# Patient Record
Sex: FEMALE | NOT HISPANIC OR LATINO | Employment: OTHER | URBAN - METROPOLITAN AREA
[De-identification: names, ages, dates, MRNs, and addresses within clinical notes are randomized per-mention and may not be internally consistent; named-entity substitution may affect disease eponyms.]

---

## 2023-04-29 LAB — URINE CULTURE: NO GROWTH

## 2024-02-08 RX ORDER — POLYETHYLENE GLYOCOL 3350, SODIUM CHLORIDE, SODIUM BICARBONATE AND POTASSIUM CHLORIDE 420; 11.2; 5.72; 1.48 G/4L; G/4L; G/4L; G/4L
POWDER, FOR SOLUTION NASOGASTRIC; ORAL
COMMUNITY
Start: 2018-10-19

## 2024-02-08 RX ORDER — OMEGA-3-ACID ETHYL ESTERS 1 G/1
1 CAPSULE, LIQUID FILLED ORAL 2 TIMES DAILY
COMMUNITY
Start: 2016-08-08 | End: 2024-02-12 | Stop reason: WASHOUT

## 2024-02-08 RX ORDER — CHOLECALCIFEROL (VITAMIN D3) 50 MCG
1 TABLET ORAL DAILY
COMMUNITY
Start: 2016-06-06

## 2024-02-08 RX ORDER — ATENOLOL 50 MG/1
TABLET ORAL
COMMUNITY
Start: 2016-02-15 | End: 2024-02-12 | Stop reason: SDUPTHER

## 2024-02-08 RX ORDER — ACETAMINOPHEN 500 MG
TABLET ORAL
COMMUNITY

## 2024-02-08 RX ORDER — ATORVASTATIN CALCIUM 20 MG/1
TABLET, FILM COATED ORAL
COMMUNITY
Start: 2016-12-05 | End: 2024-02-12 | Stop reason: SDUPTHER

## 2024-02-08 RX ORDER — PNV NO.95/FERROUS FUM/FOLIC AC 28MG-0.8MG
1 TABLET ORAL DAILY
COMMUNITY
Start: 2016-06-06

## 2024-02-12 ENCOUNTER — OFFICE VISIT (OUTPATIENT)
Dept: PRIMARY CARE | Facility: CLINIC | Age: 74
End: 2024-02-12
Payer: MEDICARE

## 2024-02-12 ENCOUNTER — TELEPHONE (OUTPATIENT)
Dept: PRIMARY CARE | Facility: CLINIC | Age: 74
End: 2024-02-12

## 2024-02-12 VITALS
WEIGHT: 165 LBS | SYSTOLIC BLOOD PRESSURE: 130 MMHG | OXYGEN SATURATION: 95 % | HEIGHT: 64 IN | TEMPERATURE: 98.4 F | BODY MASS INDEX: 28.17 KG/M2 | DIASTOLIC BLOOD PRESSURE: 60 MMHG | HEART RATE: 59 BPM

## 2024-02-12 DIAGNOSIS — F10.10 ALCOHOL ABUSE: ICD-10-CM

## 2024-02-12 DIAGNOSIS — R73.01 IFG (IMPAIRED FASTING GLUCOSE): ICD-10-CM

## 2024-02-12 DIAGNOSIS — F17.210 CIGARETTE NICOTINE DEPENDENCE WITHOUT COMPLICATION: ICD-10-CM

## 2024-02-12 DIAGNOSIS — Z00.00 ROUTINE GENERAL MEDICAL EXAMINATION AT HEALTH CARE FACILITY: Primary | ICD-10-CM

## 2024-02-12 DIAGNOSIS — I10 BENIGN ESSENTIAL HYPERTENSION: ICD-10-CM

## 2024-02-12 DIAGNOSIS — F32.A DEPRESSIVE DISORDER: ICD-10-CM

## 2024-02-12 DIAGNOSIS — E78.2 MIXED HYPERLIPIDEMIA: ICD-10-CM

## 2024-02-12 PROBLEM — Z86.010 HX OF COLONIC POLYP: Status: ACTIVE | Noted: 2024-02-12

## 2024-02-12 PROBLEM — M89.9 DISORDER OF BONE AND ARTICULAR CARTILAGE: Status: ACTIVE | Noted: 2024-02-12

## 2024-02-12 PROBLEM — E66.9 OBESITY: Status: ACTIVE | Noted: 2024-02-12

## 2024-02-12 PROBLEM — R19.5 POSITIVE COLORECTAL CANCER SCREENING USING COLOGUARD TEST: Status: ACTIVE | Noted: 2024-02-12

## 2024-02-12 PROBLEM — K63.5 BENIGN COLON POLYP: Status: ACTIVE | Noted: 2024-02-12

## 2024-02-12 PROBLEM — Z86.0100 HX OF COLONIC POLYP: Status: ACTIVE | Noted: 2024-02-12

## 2024-02-12 PROBLEM — M94.9 DISORDER OF BONE AND ARTICULAR CARTILAGE: Status: ACTIVE | Noted: 2024-02-12

## 2024-02-12 PROBLEM — K57.90 DIVERTICULOSIS: Status: ACTIVE | Noted: 2024-02-12

## 2024-02-12 PROBLEM — Z85.3 HISTORY OF RIGHT BREAST CANCER: Status: ACTIVE | Noted: 2019-10-17

## 2024-02-12 PROBLEM — F17.200 NICOTINE DEPENDENCE: Status: ACTIVE | Noted: 2024-02-12

## 2024-02-12 PROBLEM — E53.8 VITAMIN B 12 DEFICIENCY: Status: ACTIVE | Noted: 2024-02-12

## 2024-02-12 PROBLEM — E55.9 VITAMIN D DEFICIENCY: Status: ACTIVE | Noted: 2024-02-12

## 2024-02-12 PROBLEM — K64.8 INTERNAL HEMORRHOIDS: Status: ACTIVE | Noted: 2024-02-12

## 2024-02-12 PROCEDURE — 3075F SYST BP GE 130 - 139MM HG: CPT | Performed by: FAMILY MEDICINE

## 2024-02-12 PROCEDURE — 1170F FXNL STATUS ASSESSED: CPT | Performed by: FAMILY MEDICINE

## 2024-02-12 PROCEDURE — 93000 ELECTROCARDIOGRAM COMPLETE: CPT | Performed by: FAMILY MEDICINE

## 2024-02-12 PROCEDURE — 3078F DIAST BP <80 MM HG: CPT | Performed by: FAMILY MEDICINE

## 2024-02-12 PROCEDURE — 99214 OFFICE O/P EST MOD 30 MIN: CPT | Performed by: FAMILY MEDICINE

## 2024-02-12 PROCEDURE — 1160F RVW MEDS BY RX/DR IN RCRD: CPT | Performed by: FAMILY MEDICINE

## 2024-02-12 PROCEDURE — 1159F MED LIST DOCD IN RCRD: CPT | Performed by: FAMILY MEDICINE

## 2024-02-12 PROCEDURE — 1126F AMNT PAIN NOTED NONE PRSNT: CPT | Performed by: FAMILY MEDICINE

## 2024-02-12 PROCEDURE — G0008 ADMIN INFLUENZA VIRUS VAC: HCPCS | Performed by: FAMILY MEDICINE

## 2024-02-12 PROCEDURE — 90662 IIV NO PRSV INCREASED AG IM: CPT | Performed by: FAMILY MEDICINE

## 2024-02-12 PROCEDURE — G0439 PPPS, SUBSEQ VISIT: HCPCS | Performed by: FAMILY MEDICINE

## 2024-02-12 RX ORDER — ATORVASTATIN CALCIUM 20 MG/1
20 TABLET, FILM COATED ORAL NIGHTLY
Qty: 90 TABLET | Refills: 3 | Status: SHIPPED | OUTPATIENT
Start: 2024-02-12 | End: 2025-02-11

## 2024-02-12 RX ORDER — ATENOLOL 50 MG/1
50 TABLET ORAL DAILY
Qty: 90 TABLET | Refills: 1 | Status: SHIPPED | OUTPATIENT
Start: 2024-02-12 | End: 2024-08-10

## 2024-02-12 ASSESSMENT — PATIENT HEALTH QUESTIONNAIRE - PHQ9
1. LITTLE INTEREST OR PLEASURE IN DOING THINGS: NOT AT ALL
SUM OF ALL RESPONSES TO PHQ9 QUESTIONS 1 AND 2: 0
2. FEELING DOWN, DEPRESSED OR HOPELESS: NOT AT ALL

## 2024-02-12 ASSESSMENT — ENCOUNTER SYMPTOMS
CARDIOVASCULAR NEGATIVE: 1
EYES NEGATIVE: 1
NEUROLOGICAL NEGATIVE: 1
CONSTITUTIONAL NEGATIVE: 1
RESPIRATORY NEGATIVE: 1
GASTROINTESTINAL NEGATIVE: 1
ALLERGIC/IMMUNOLOGIC NEGATIVE: 1
HEMATOLOGIC/LYMPHATIC NEGATIVE: 1
PSYCHIATRIC NEGATIVE: 1
MUSCULOSKELETAL NEGATIVE: 1

## 2024-02-12 ASSESSMENT — ACTIVITIES OF DAILY LIVING (ADL)
TAKING_MEDICATION: INDEPENDENT
DOING_HOUSEWORK: INDEPENDENT
GROCERY_SHOPPING: INDEPENDENT
DRESSING: INDEPENDENT
MANAGING_FINANCES: INDEPENDENT
BATHING: INDEPENDENT

## 2024-02-12 ASSESSMENT — PAIN SCALES - GENERAL: PAINLEVEL: 0-NO PAIN

## 2024-02-12 NOTE — PROGRESS NOTES
Subjective   Reason for Visit: Enedina Camacho is an 73 y.o. female here for a Medicare Wellness visit.     Past Medical, Surgical, and Family History reviewed and updated in chart.    Reviewed all medications by prescribing practitioner or clinical pharmacist (such as prescriptions, OTCs, herbal therapies and supplements) and documented in the medical record.    Pneumonia and COVID-19 vaccinations are UTD.  She agrees to receive an influenza vaccination today.  Tetanus vaccination status is unknown.  She declines a booster.  She also declines a shingles vaccination.  She had a normal screening mammogram completed last year, and she declines this year.  DEXA is overdue, and she declines an order this year.  She had a screening colonoscopy completed in 2018, and it was normal.  She was told to repeat it in 10 years.  She is due for an EKG and agrees to complete one today.  She did not complete fasting labs for this visit.  She currently smokes 1 ppd and has been smoking for 20-30 years.  She declines a CT of the lung.  1) HTN: controlled, compliant with atenolol, follows a low salt diet, walks 1 hour daily, needs a refill.   2) Hyperlipidemia: current status unknown, compliant with atorvastatin, follows a low fat diet, exercises regularly, needs refill.  3) IFG: current status unknown, no medication taken, tries to follow a low carb diet, exercises regularly.   4) Tobacco user: see above, does not want to quit.   5) Alcohol user: drinks a bottle of wine per week, does not drink it all at once, denies intoxication, drinking does not affect ADLs and house work.     6) Depression: resolved, denies drinking due to depression, denies SI and HI.    Patient Care Team:  Susana Roy DO as PCP - General     Review of Systems   Constitutional: Negative.    HENT: Negative.     Eyes: Negative.    Respiratory: Negative.     Cardiovascular: Negative.    Gastrointestinal: Negative.    Genitourinary: Negative.    Musculoskeletal:  "Negative.    Skin: Negative.    Allergic/Immunologic: Negative.    Neurological: Negative.    Hematological: Negative.    Psychiatric/Behavioral: Negative.       Objective   Vitals:  /60 (BP Location: Left arm)   Pulse 59   Temp 36.9 °C (98.4 °F) (Temporal)   Ht 1.613 m (5' 3.5\")   Wt 74.8 kg (165 lb)   SpO2 95%   BMI 28.77 kg/m²       Physical Exam  Vitals reviewed.   Constitutional:       Appearance: Normal appearance.   HENT:      Head: Normocephalic and atraumatic.      Right Ear: Tympanic membrane, ear canal and external ear normal.      Left Ear: Tympanic membrane, ear canal and external ear normal.      Nose: Nose normal.      Mouth/Throat:      Mouth: Mucous membranes are moist.      Pharynx: Oropharynx is clear.   Eyes:      Extraocular Movements: Extraocular movements intact.      Conjunctiva/sclera: Conjunctivae normal.      Pupils: Pupils are equal, round, and reactive to light.   Cardiovascular:      Rate and Rhythm: Normal rate and regular rhythm.      Pulses: Normal pulses.      Heart sounds: Normal heart sounds.   Pulmonary:      Effort: Pulmonary effort is normal.      Breath sounds: Normal breath sounds.   Abdominal:      General: Bowel sounds are normal.      Palpations: Abdomen is soft.   Musculoskeletal:         General: Normal range of motion.      Cervical back: Normal range of motion and neck supple.   Skin:     General: Skin is warm and dry.      Capillary Refill: Capillary refill takes less than 2 seconds.   Neurological:      General: No focal deficit present.      Mental Status: She is alert and oriented to person, place, and time.   Psychiatric:         Mood and Affect: Mood normal.         Behavior: Behavior normal.     Assessment/Plan   Problem List Items Addressed This Visit       Benign essential hypertension    Relevant Medications    atenolol (Tenormin) 50 mg tablet    Other Relevant Orders    ECG 12 lead (Clinic Performed) (Completed)    CBC and Auto Differential    " Comprehensive Metabolic Panel    Urinalysis with Reflex Microscopic    Albumin , Urine Random    Mixed hyperlipidemia    Relevant Medications    atorvastatin (Lipitor) 20 mg tablet    Other Relevant Orders    Comprehensive Metabolic Panel    Lipid Panel    Urinalysis with Reflex Microscopic    IFG (impaired fasting glucose)    Relevant Orders    Comprehensive Metabolic Panel    Hemoglobin A1C    Urinalysis with Reflex Microscopic    Albumin , Urine Random    Nicotine dependence    Depressive disorder    Alcohol abuse     Other Visit Diagnoses       Routine general medical examination at health care facility    -  Primary    Relevant Orders    CBC and Auto Differential    Comprehensive Metabolic Panel    Hemoglobin A1C    Lipid Panel    Urinalysis with Reflex Microscopic    Albumin , Urine Random        Routine GME  Pneumonia and COVID-19 vaccinations UTD.  Influenza vaccination given today.   Patient declines tetanus and shingles vaccinations.    Patient declines breast cancer screening.  Patient declines DEXA.  Colon cancer screening UTD.  Patient declines lung cancer screening.  EKG completed.  Fasting labs ordered.  Will notify with results.   Repeat exam in 1 year.  HTN  Controlled.  Continue with atenolol, refilled.    Low salt diet.  Regular exercise.  Manage weight.  Follow up in 6 months.    Hyperlipidemia  Current status unknown.  Labs ordered.  Will notify with results.  Continue with atorvastatin, refilled.    Low fat diet.  Regular exercise.  Manage weight.  Follow up in 1 year.   IFG  Current status unknown.  Labs ordered.  Will notify with results.  No medication prescribed.  Low carb diet.  Regular exercise.  Manage weight.  Follow up in 1 year.   Tobacco user  Current.  Patient declines cessation.  Follow up in 1 year.    Alcohol user  Current.  Alcohol use does not affect ADLs.  One glass a day is appropriate.  Follow up in 1 year.  Depression  Resolved.  No medication taken.  Monitor mood.  Follow  up in 1 year.

## 2024-02-13 ENCOUNTER — TELEPHONE (OUTPATIENT)
Dept: PRIMARY CARE | Facility: CLINIC | Age: 74
End: 2024-02-13
Payer: MEDICARE

## 2024-02-13 NOTE — TELEPHONE ENCOUNTER
Pts sister called Gordy  [phone] was seen yesterday for a CPE, she did not go in the room with her but thinks she should from now on as pt does not always understand things,  is concerned about her she will take her to get her blood work done as pt said  she didn't have any forms

## 2024-02-16 ENCOUNTER — LAB (OUTPATIENT)
Dept: LAB | Facility: LAB | Age: 74
End: 2024-02-16
Payer: MEDICARE

## 2024-02-16 DIAGNOSIS — R73.01 IFG (IMPAIRED FASTING GLUCOSE): ICD-10-CM

## 2024-02-16 DIAGNOSIS — I10 BENIGN ESSENTIAL HYPERTENSION: ICD-10-CM

## 2024-02-16 DIAGNOSIS — Z00.00 ROUTINE GENERAL MEDICAL EXAMINATION AT HEALTH CARE FACILITY: ICD-10-CM

## 2024-02-16 DIAGNOSIS — E78.2 MIXED HYPERLIPIDEMIA: ICD-10-CM

## 2024-02-16 LAB
ALBUMIN SERPL-MCNC: 4 G/DL (ref 3.5–5)
ALP BLD-CCNC: 81 U/L (ref 35–125)
ALT SERPL-CCNC: 13 U/L (ref 5–40)
ANION GAP SERPL CALC-SCNC: 12 MMOL/L
APPEARANCE UR: CLEAR
AST SERPL-CCNC: 17 U/L (ref 5–40)
BASOPHILS # BLD AUTO: 0.04 X10*3/UL (ref 0–0.1)
BASOPHILS NFR BLD AUTO: 0.6 %
BILIRUB SERPL-MCNC: 0.2 MG/DL (ref 0.1–1.2)
BILIRUB UR STRIP.AUTO-MCNC: NEGATIVE MG/DL
BUN SERPL-MCNC: 11 MG/DL (ref 8–25)
CALCIUM SERPL-MCNC: 9.4 MG/DL (ref 8.5–10.4)
CHLORIDE SERPL-SCNC: 104 MMOL/L (ref 97–107)
CHOLEST SERPL-MCNC: 171 MG/DL (ref 133–200)
CHOLEST/HDLC SERPL: 3.2 {RATIO}
CO2 SERPL-SCNC: 27 MMOL/L (ref 24–31)
COLOR UR: COLORLESS
CREAT SERPL-MCNC: 0.8 MG/DL (ref 0.4–1.6)
CREAT UR-MCNC: 51.9 MG/DL
EGFRCR SERPLBLD CKD-EPI 2021: 77 ML/MIN/1.73M*2
EOSINOPHIL # BLD AUTO: 0.18 X10*3/UL (ref 0–0.4)
EOSINOPHIL NFR BLD AUTO: 2.8 %
ERYTHROCYTE [DISTWIDTH] IN BLOOD BY AUTOMATED COUNT: 12.7 % (ref 11.5–14.5)
EST. AVERAGE GLUCOSE BLD GHB EST-MCNC: 108 MG/DL
GLUCOSE SERPL-MCNC: 97 MG/DL (ref 65–99)
GLUCOSE UR STRIP.AUTO-MCNC: NORMAL MG/DL
HBA1C MFR BLD: 5.4 %
HCT VFR BLD AUTO: 41.2 % (ref 36–46)
HDLC SERPL-MCNC: 53 MG/DL
HGB BLD-MCNC: 13.1 G/DL (ref 12–16)
IMM GRANULOCYTES # BLD AUTO: 0.02 X10*3/UL (ref 0–0.5)
IMM GRANULOCYTES NFR BLD AUTO: 0.3 % (ref 0–0.9)
KETONES UR STRIP.AUTO-MCNC: NEGATIVE MG/DL
LDLC SERPL CALC-MCNC: 94 MG/DL (ref 65–130)
LEUKOCYTE ESTERASE UR QL STRIP.AUTO: ABNORMAL
LYMPHOCYTES # BLD AUTO: 1.07 X10*3/UL (ref 0.8–3)
LYMPHOCYTES NFR BLD AUTO: 16.6 %
MCH RBC QN AUTO: 30.5 PG (ref 26–34)
MCHC RBC AUTO-ENTMCNC: 31.8 G/DL (ref 32–36)
MCV RBC AUTO: 96 FL (ref 80–100)
MICROALBUMIN UR-MCNC: <12 MG/L (ref 0–23)
MICROALBUMIN/CREAT UR: NORMAL MG/G{CREAT}
MONOCYTES # BLD AUTO: 0.38 X10*3/UL (ref 0.05–0.8)
MONOCYTES NFR BLD AUTO: 5.9 %
MUCOUS THREADS #/AREA URNS AUTO: NORMAL /LPF
NEUTROPHILS # BLD AUTO: 4.76 X10*3/UL (ref 1.6–5.5)
NEUTROPHILS NFR BLD AUTO: 73.8 %
NITRITE UR QL STRIP.AUTO: NEGATIVE
NRBC BLD-RTO: 0 /100 WBCS (ref 0–0)
PH UR STRIP.AUTO: 5 [PH]
PLATELET # BLD AUTO: 265 X10*3/UL (ref 150–450)
POTASSIUM SERPL-SCNC: 4.8 MMOL/L (ref 3.4–5.1)
PROT SERPL-MCNC: 6 G/DL (ref 5.9–7.9)
PROT UR STRIP.AUTO-MCNC: NEGATIVE MG/DL
RBC # BLD AUTO: 4.3 X10*6/UL (ref 4–5.2)
RBC # UR STRIP.AUTO: NEGATIVE /UL
RBC #/AREA URNS AUTO: NORMAL /HPF
SODIUM SERPL-SCNC: 143 MMOL/L (ref 133–145)
SP GR UR STRIP.AUTO: 1.01
SQUAMOUS #/AREA URNS AUTO: NORMAL /HPF
TRIGL SERPL-MCNC: 120 MG/DL (ref 40–150)
UROBILINOGEN UR STRIP.AUTO-MCNC: NORMAL MG/DL
WBC # BLD AUTO: 6.5 X10*3/UL (ref 4.4–11.3)
WBC #/AREA URNS AUTO: NORMAL /HPF

## 2024-02-16 PROCEDURE — 80061 LIPID PANEL: CPT

## 2024-02-16 PROCEDURE — 83036 HEMOGLOBIN GLYCOSYLATED A1C: CPT

## 2024-02-16 PROCEDURE — 82570 ASSAY OF URINE CREATININE: CPT

## 2024-02-16 PROCEDURE — 82043 UR ALBUMIN QUANTITATIVE: CPT

## 2024-02-16 PROCEDURE — 36415 COLL VENOUS BLD VENIPUNCTURE: CPT

## 2024-02-16 PROCEDURE — 85025 COMPLETE CBC W/AUTO DIFF WBC: CPT

## 2024-02-16 PROCEDURE — 80053 COMPREHEN METABOLIC PANEL: CPT

## 2024-02-16 PROCEDURE — 81001 URINALYSIS AUTO W/SCOPE: CPT

## 2024-08-09 ENCOUNTER — LAB (OUTPATIENT)
Dept: LAB | Facility: LAB | Age: 74
End: 2024-08-09
Payer: MEDICARE

## 2024-08-09 DIAGNOSIS — I10 BENIGN ESSENTIAL HYPERTENSION: ICD-10-CM

## 2024-08-09 LAB
ANION GAP SERPL CALC-SCNC: 12 MMOL/L (ref 10–20)
APPEARANCE UR: CLEAR
BILIRUB UR STRIP.AUTO-MCNC: NEGATIVE MG/DL
BUN SERPL-MCNC: 10 MG/DL (ref 6–23)
CALCIUM SERPL-MCNC: 8.7 MG/DL (ref 8.6–10.3)
CHLORIDE SERPL-SCNC: 106 MMOL/L (ref 98–107)
CO2 SERPL-SCNC: 27 MMOL/L (ref 21–32)
COLOR UR: ABNORMAL
CREAT SERPL-MCNC: 0.75 MG/DL (ref 0.5–1.05)
EGFRCR SERPLBLD CKD-EPI 2021: 83 ML/MIN/1.73M*2
GLUCOSE SERPL-MCNC: 87 MG/DL (ref 74–99)
GLUCOSE UR STRIP.AUTO-MCNC: NORMAL MG/DL
KETONES UR STRIP.AUTO-MCNC: NEGATIVE MG/DL
LEUKOCYTE ESTERASE UR QL STRIP.AUTO: ABNORMAL
MUCOUS THREADS #/AREA URNS AUTO: NORMAL /LPF
NITRITE UR QL STRIP.AUTO: NEGATIVE
PH UR STRIP.AUTO: 5.5 [PH]
POTASSIUM SERPL-SCNC: 4.1 MMOL/L (ref 3.5–5.3)
PROT UR STRIP.AUTO-MCNC: NEGATIVE MG/DL
RBC # UR STRIP.AUTO: NEGATIVE /UL
RBC #/AREA URNS AUTO: NORMAL /HPF
SODIUM SERPL-SCNC: 141 MMOL/L (ref 136–145)
SP GR UR STRIP.AUTO: 1.01
SQUAMOUS #/AREA URNS AUTO: NORMAL /HPF
UROBILINOGEN UR STRIP.AUTO-MCNC: NORMAL MG/DL
WBC #/AREA URNS AUTO: NORMAL /HPF

## 2024-08-09 PROCEDURE — 36415 COLL VENOUS BLD VENIPUNCTURE: CPT

## 2024-08-09 PROCEDURE — 80048 BASIC METABOLIC PNL TOTAL CA: CPT

## 2024-08-09 PROCEDURE — 81001 URINALYSIS AUTO W/SCOPE: CPT

## 2024-08-12 ENCOUNTER — TELEPHONE (OUTPATIENT)
Dept: PRIMARY CARE | Facility: CLINIC | Age: 74
End: 2024-08-12

## 2024-08-12 ENCOUNTER — APPOINTMENT (OUTPATIENT)
Dept: PRIMARY CARE | Facility: CLINIC | Age: 74
End: 2024-08-12
Payer: MEDICARE

## 2024-08-12 VITALS
HEIGHT: 64 IN | TEMPERATURE: 97.5 F | OXYGEN SATURATION: 92 % | DIASTOLIC BLOOD PRESSURE: 65 MMHG | WEIGHT: 161 LBS | BODY MASS INDEX: 27.49 KG/M2 | HEART RATE: 68 BPM | SYSTOLIC BLOOD PRESSURE: 133 MMHG

## 2024-08-12 DIAGNOSIS — I10 BENIGN ESSENTIAL HYPERTENSION: ICD-10-CM

## 2024-08-12 DIAGNOSIS — R73.01 IFG (IMPAIRED FASTING GLUCOSE): ICD-10-CM

## 2024-08-12 DIAGNOSIS — E78.2 MIXED HYPERLIPIDEMIA: ICD-10-CM

## 2024-08-12 PROCEDURE — 1160F RVW MEDS BY RX/DR IN RCRD: CPT | Performed by: FAMILY MEDICINE

## 2024-08-12 PROCEDURE — 3075F SYST BP GE 130 - 139MM HG: CPT | Performed by: FAMILY MEDICINE

## 2024-08-12 PROCEDURE — 1159F MED LIST DOCD IN RCRD: CPT | Performed by: FAMILY MEDICINE

## 2024-08-12 PROCEDURE — 99213 OFFICE O/P EST LOW 20 MIN: CPT | Performed by: FAMILY MEDICINE

## 2024-08-12 PROCEDURE — 3078F DIAST BP <80 MM HG: CPT | Performed by: FAMILY MEDICINE

## 2024-08-12 RX ORDER — ATENOLOL 50 MG/1
50 TABLET ORAL DAILY
Qty: 90 TABLET | Refills: 1 | Status: SHIPPED | OUTPATIENT
Start: 2024-08-12 | End: 2025-02-08

## 2024-08-12 ASSESSMENT — ENCOUNTER SYMPTOMS
CARDIOVASCULAR NEGATIVE: 1
RESPIRATORY NEGATIVE: 1

## 2024-08-12 ASSESSMENT — COLUMBIA-SUICIDE SEVERITY RATING SCALE - C-SSRS
6. HAVE YOU EVER DONE ANYTHING, STARTED TO DO ANYTHING, OR PREPARED TO DO ANYTHING TO END YOUR LIFE?: NO
2. HAVE YOU ACTUALLY HAD ANY THOUGHTS OF KILLING YOURSELF?: NO
1. IN THE PAST MONTH, HAVE YOU WISHED YOU WERE DEAD OR WISHED YOU COULD GO TO SLEEP AND NOT WAKE UP?: NO

## 2024-08-12 ASSESSMENT — PATIENT HEALTH QUESTIONNAIRE - PHQ9
1. LITTLE INTEREST OR PLEASURE IN DOING THINGS: NOT AT ALL
2. FEELING DOWN, DEPRESSED OR HOPELESS: NOT AT ALL
SUM OF ALL RESPONSES TO PHQ9 QUESTIONS 1 AND 2: 0

## 2024-08-12 NOTE — PROGRESS NOTES
"Subjective   Patient ID: Enedina Camacho is a 73 y.o. female who presents for Blood Pressure Check (Pt did labs ).    HTN: controlled, compliant with atenolol, has a blood pressure machine at home but has not used it, follows a low salt diet, walks in neighborhood 5 days a week, needs a refill.     Review of Systems   Respiratory: Negative.     Cardiovascular: Negative.      Objective   /65 (BP Location: Left arm, Patient Position: Sitting, BP Cuff Size: Adult)   Pulse 68   Temp 36.4 °C (97.5 °F) (Temporal)   Ht 1.613 m (5' 3.5\")   Wt 73 kg (161 lb)   SpO2 92%   BMI 28.07 kg/m²     Physical Exam  Vitals and nursing note reviewed.   Constitutional:       Appearance: Normal appearance.      Comments: Accompanied by sister, Gordy.   Cardiovascular:      Rate and Rhythm: Normal rate and regular rhythm.   Pulmonary:      Effort: Pulmonary effort is normal.      Breath sounds: Normal breath sounds.   Neurological:      Mental Status: She is alert.     Assessment/Plan   Diagnoses and all orders for this visit:  Benign essential hypertension  Controlled.  Atenolol (Tenormin) 50 mg tablet refilled.  Encouraged patient to check blood pressure at home.  Low salt diet.  Regular exercise.  Manage weight.  Follow up in 6 months for CPE.    Other orders  -     Follow Up In Primary Care - Established  -     Follow Up In Primary Care - Health Maintenance; Future     "

## 2025-02-19 ENCOUNTER — APPOINTMENT (OUTPATIENT)
Dept: PRIMARY CARE | Facility: CLINIC | Age: 75
End: 2025-02-19
Payer: MEDICARE

## 2025-02-19 ENCOUNTER — TELEPHONE (OUTPATIENT)
Dept: PRIMARY CARE | Facility: CLINIC | Age: 75
End: 2025-02-19

## 2025-02-19 VITALS
WEIGHT: 161 LBS | DIASTOLIC BLOOD PRESSURE: 74 MMHG | SYSTOLIC BLOOD PRESSURE: 137 MMHG | HEART RATE: 79 BPM | TEMPERATURE: 97.1 F | BODY MASS INDEX: 27.49 KG/M2 | OXYGEN SATURATION: 94 % | HEIGHT: 64 IN

## 2025-02-19 DIAGNOSIS — Z78.9 ALCOHOL USE: ICD-10-CM

## 2025-02-19 DIAGNOSIS — I10 BENIGN ESSENTIAL HYPERTENSION: ICD-10-CM

## 2025-02-19 DIAGNOSIS — Z00.00 ROUTINE GENERAL MEDICAL EXAMINATION AT HEALTH CARE FACILITY: Primary | ICD-10-CM

## 2025-02-19 DIAGNOSIS — E78.2 MIXED HYPERLIPIDEMIA: ICD-10-CM

## 2025-02-19 DIAGNOSIS — F17.210 CIGARETTE NICOTINE DEPENDENCE WITHOUT COMPLICATION: ICD-10-CM

## 2025-02-19 PROBLEM — F10.90 ALCOHOL USE: Status: ACTIVE | Noted: 2025-02-19

## 2025-02-19 PROBLEM — F32.A DEPRESSIVE DISORDER: Status: RESOLVED | Noted: 2022-08-03 | Resolved: 2025-02-19

## 2025-02-19 PROBLEM — F10.10 ALCOHOL ABUSE: Status: RESOLVED | Noted: 2024-02-12 | Resolved: 2025-02-19

## 2025-02-19 PROCEDURE — 3075F SYST BP GE 130 - 139MM HG: CPT | Performed by: FAMILY MEDICINE

## 2025-02-19 PROCEDURE — 1170F FXNL STATUS ASSESSED: CPT | Performed by: FAMILY MEDICINE

## 2025-02-19 PROCEDURE — 99214 OFFICE O/P EST MOD 30 MIN: CPT | Performed by: FAMILY MEDICINE

## 2025-02-19 PROCEDURE — G0439 PPPS, SUBSEQ VISIT: HCPCS | Performed by: FAMILY MEDICINE

## 2025-02-19 PROCEDURE — 3078F DIAST BP <80 MM HG: CPT | Performed by: FAMILY MEDICINE

## 2025-02-19 PROCEDURE — 1160F RVW MEDS BY RX/DR IN RCRD: CPT | Performed by: FAMILY MEDICINE

## 2025-02-19 PROCEDURE — 1159F MED LIST DOCD IN RCRD: CPT | Performed by: FAMILY MEDICINE

## 2025-02-19 RX ORDER — ATORVASTATIN CALCIUM 20 MG/1
20 TABLET, FILM COATED ORAL NIGHTLY
Qty: 90 TABLET | Refills: 0 | Status: SHIPPED | OUTPATIENT
Start: 2025-02-19 | End: 2025-05-20

## 2025-02-19 RX ORDER — ATENOLOL 50 MG/1
50 TABLET ORAL DAILY
Qty: 90 TABLET | Refills: 1 | Status: SHIPPED | OUTPATIENT
Start: 2025-02-19 | End: 2025-08-18

## 2025-02-19 RX ORDER — PREDNISOLONE ACETATE 10 MG/ML
SUSPENSION/ DROPS OPHTHALMIC
COMMUNITY
Start: 2025-02-13

## 2025-02-19 ASSESSMENT — ENCOUNTER SYMPTOMS
NEUROLOGICAL NEGATIVE: 1
RESPIRATORY NEGATIVE: 1
CONSTITUTIONAL NEGATIVE: 1
HEMATOLOGIC/LYMPHATIC NEGATIVE: 1
MUSCULOSKELETAL NEGATIVE: 1
ALLERGIC/IMMUNOLOGIC NEGATIVE: 1
PSYCHIATRIC NEGATIVE: 1
GASTROINTESTINAL NEGATIVE: 1
EYES NEGATIVE: 1
CARDIOVASCULAR NEGATIVE: 1

## 2025-02-19 ASSESSMENT — ACTIVITIES OF DAILY LIVING (ADL)
MANAGING_FINANCES: INDEPENDENT
DRESSING: INDEPENDENT
DOING_HOUSEWORK: INDEPENDENT
GROCERY_SHOPPING: INDEPENDENT
BATHING: INDEPENDENT
TAKING_MEDICATION: INDEPENDENT

## 2025-02-19 ASSESSMENT — PATIENT HEALTH QUESTIONNAIRE - PHQ9
SUM OF ALL RESPONSES TO PHQ9 QUESTIONS 1 AND 2: 0
1. LITTLE INTEREST OR PLEASURE IN DOING THINGS: NOT AT ALL
2. FEELING DOWN, DEPRESSED OR HOPELESS: NOT AT ALL

## 2025-02-19 NOTE — ASSESSMENT & PLAN NOTE
Current status unknown  Continue with atorvastatin, refilled.  Low fat diet.  Regular exercise.  Manage weight.  Follow up in 1 year.   Orders:    atorvastatin (Lipitor) 20 mg tablet; Take 1 tablet (20 mg) by mouth once daily at bedtime.

## 2025-02-19 NOTE — ASSESSMENT & PLAN NOTE
Unchanged  Patient declines to quit.  Patient declines lung cancer screening.  Follow up in 1 year.

## 2025-02-19 NOTE — PROGRESS NOTES
Subjective   Reason for Visit: Enedina Camacho is an 74 y.o. female here for a Medicare Wellness visit.      Reviewed all medications by prescribing practitioner or clinical pharmacist (such as prescriptions, OTCs, herbal therapies and supplements) and documented in the medical record.    Pneumonia and COVID-19 vaccinations are UTD.  She may receive an influenza vaccination at the pharmacy.  She declines tetanus and shingles vaccinations.  She is overdue for a screening mammogram and declines an order today.  She does self-breast exams at home every month.  DEXA is overdue, and she declines an order this year.  She had a screening colonoscopy completed in 2018, and it was normal.  She was told to repeat it in 10 years.  EKG is UTD.  She did not complete fasting labs for this visit.  She currently smokes 1 ppd and has been smoking for 20-30 years.  She declines a CT of the lung.  1) HTN: controlled, compliant with atenolol, follows a low salt diet, walks 1 hour daily, needs a refill.   2) Hyperlipidemia: current status unknown, compliant with atorvastatin, follows a low fat diet, exercises regularly, needs refill.  3) IFG: resolved, no medication taken, tries to follow a low carb diet, exercises regularly.   4) Tobacco user: see above, does not want to quit.   5) Alcohol user: drinks a bottle of wine per week, does not drink it all at once, denies intoxication, drinking does not affect ADLs and house work.     6) Depression: resolved, denies drinking due to depression, denies SI and HI.    Patient Care Team:  Susana Roy DO as PCP - General     Review of Systems   Constitutional: Negative.    HENT: Negative.     Eyes: Negative.    Respiratory: Negative.     Cardiovascular: Negative.    Gastrointestinal: Negative.    Genitourinary: Negative.    Musculoskeletal: Negative.    Skin: Negative.    Allergic/Immunologic: Negative.    Neurological: Negative.    Hematological: Negative.    Psychiatric/Behavioral: Negative.  "      Objective   Vitals:  /74 (BP Location: Left arm, Patient Position: Sitting, BP Cuff Size: Adult)   Pulse 79   Temp 36.2 °C (97.1 °F) (Oral)   Ht 1.613 m (5' 3.5\")   Wt 73 kg (161 lb)   SpO2 94%   BMI 28.07 kg/m²       Physical Exam  Vitals and nursing note reviewed.   Constitutional:       Appearance: Normal appearance.   HENT:      Head: Normocephalic and atraumatic.      Right Ear: Tympanic membrane, ear canal and external ear normal.      Left Ear: Tympanic membrane, ear canal and external ear normal.      Nose: Nose normal.      Mouth/Throat:      Mouth: Mucous membranes are moist.      Pharynx: Oropharynx is clear.   Eyes:      Extraocular Movements: Extraocular movements intact.      Conjunctiva/sclera: Conjunctivae normal.      Pupils: Pupils are equal, round, and reactive to light.   Neck:      Vascular: No carotid bruit.      Comments: No thyromegaly  Cardiovascular:      Rate and Rhythm: Normal rate and regular rhythm.      Pulses: Normal pulses.      Heart sounds: Normal heart sounds.   Pulmonary:      Effort: Pulmonary effort is normal.      Breath sounds: Normal breath sounds.   Abdominal:      General: Bowel sounds are normal.      Palpations: Abdomen is soft.   Musculoskeletal:         General: Normal range of motion.      Cervical back: Normal range of motion and neck supple.      Comments: 5/5 muscle strength x 4 extremities   Skin:     General: Skin is warm and dry.      Capillary Refill: Capillary refill takes less than 2 seconds.   Neurological:      General: No focal deficit present.      Mental Status: She is alert and oriented to person, place, and time.   Psychiatric:         Mood and Affect: Mood normal.         Behavior: Behavior normal.       Assessment & Plan  Routine general medical examination at health care facility  PE completed.  Pneumonia and COVID-19 vaccinations UTD.  Patient may receive influenza vaccination this year.  Patient declines tetanus and shingles " vaccinations.    Patient declines screening mammogram.  Patient will continue to do self breast exams.  Patient declines DEXA.  Colon cancer screening UTD.  EKG UTD.  Patient will complete fasting labs soon.  Will notify with results.  Repeat exam in 1 year.   Orders:    1 Year Follow Up In Primary Care - Wellness Exam; Future    Benign essential hypertension  Controlled  Continue with atenolol, refilled.  Low salt diet.   Regular exercise.  Manage weight.  Follow up in 6 months.   Orders:    atenolol (Tenormin) 50 mg tablet; Take 1 tablet (50 mg) by mouth once daily.    Mixed hyperlipidemia  Current status unknown  Continue with atorvastatin, refilled.  Low fat diet.  Regular exercise.  Manage weight.  Follow up in 1 year.   Orders:    atorvastatin (Lipitor) 20 mg tablet; Take 1 tablet (20 mg) by mouth once daily at bedtime.    Cigarette nicotine dependence without complication  Unchanged  Patient declines to quit.  Patient declines lung cancer screening.  Follow up in 1 year.        Alcohol use  Unchanged  Follow up in 1 year.

## 2025-05-22 DIAGNOSIS — E78.2 MIXED HYPERLIPIDEMIA: ICD-10-CM

## 2025-05-22 RX ORDER — ATORVASTATIN CALCIUM 20 MG/1
20 TABLET, FILM COATED ORAL NIGHTLY
Qty: 90 TABLET | Refills: 2 | Status: SHIPPED | OUTPATIENT
Start: 2025-05-22 | End: 2025-08-20

## 2025-08-20 ENCOUNTER — APPOINTMENT (OUTPATIENT)
Dept: PRIMARY CARE | Facility: CLINIC | Age: 75
End: 2025-08-20
Payer: MEDICARE

## 2025-08-21 ENCOUNTER — APPOINTMENT (OUTPATIENT)
Dept: PRIMARY CARE | Facility: CLINIC | Age: 75
End: 2025-08-21
Payer: MEDICARE